# Patient Record
Sex: FEMALE | Race: WHITE
[De-identification: names, ages, dates, MRNs, and addresses within clinical notes are randomized per-mention and may not be internally consistent; named-entity substitution may affect disease eponyms.]

---

## 2021-04-29 ENCOUNTER — HOSPITAL ENCOUNTER (OUTPATIENT)
Dept: HOSPITAL 35 - LAB | Age: 51
End: 2021-04-29
Attending: NURSE PRACTITIONER
Payer: COMMERCIAL

## 2021-04-29 DIAGNOSIS — I10: Primary | ICD-10-CM

## 2021-04-29 LAB
ALBUMIN SERPL-MCNC: 3.7 G/DL (ref 3.4–5)
ALT SERPL-CCNC: 16 U/L (ref 30–65)
ANION GAP SERPL CALC-SCNC: 7 MMOL/L (ref 7–16)
AST SERPL-CCNC: 17 U/L (ref 15–37)
BASOPHILS NFR BLD AUTO: 0.3 % (ref 0–2)
BILIRUB SERPL-MCNC: 0.5 MG/DL (ref 0.2–1)
BUN SERPL-MCNC: 12 MG/DL (ref 7–18)
CALCIUM SERPL-MCNC: 9.3 MG/DL (ref 8.5–10.1)
CHLORIDE SERPL-SCNC: 101 MMOL/L (ref 98–107)
CO2 SERPL-SCNC: 29 MMOL/L (ref 21–32)
CREAT SERPL-MCNC: 1 MG/DL (ref 0.6–1)
EOSINOPHIL NFR BLD: 1.1 % (ref 0–3)
ERYTHROCYTE [DISTWIDTH] IN BLOOD BY AUTOMATED COUNT: 13.5 % (ref 10.5–14.5)
GLUCOSE SERPL-MCNC: 89 MG/DL (ref 74–106)
GRANULOCYTES NFR BLD MANUAL: 66.2 % (ref 36–66)
HCT VFR BLD CALC: 45.4 % (ref 37–47)
HGB BLD-MCNC: 15.8 GM/DL (ref 12–15)
LIPASE: 242 U/L (ref 73–393)
LYMPHOCYTES NFR BLD AUTO: 22.8 % (ref 24–44)
MCH RBC QN AUTO: 35.1 PG (ref 26–34)
MCHC RBC AUTO-ENTMCNC: 34.9 G/DL (ref 28–37)
MCV RBC: 100.5 FL (ref 80–100)
MONOCYTES NFR BLD: 9.6 % (ref 1–8)
NEUTROPHILS # BLD: 6.2 THOU/UL (ref 1.4–8.2)
PLATELET # BLD: 279 THOU/UL (ref 150–400)
POTASSIUM SERPL-SCNC: 4 MMOL/L (ref 3.5–5.1)
PROT SERPL-MCNC: 7.5 G/DL (ref 6.4–8.2)
RBC # BLD AUTO: 4.52 MIL/UL (ref 4.2–5)
SODIUM SERPL-SCNC: 137 MMOL/L (ref 136–145)
WBC # BLD AUTO: 9.4 THOU/UL (ref 4–11)

## 2021-08-09 ENCOUNTER — HOSPITAL ENCOUNTER (OUTPATIENT)
Dept: HOSPITAL 35 - GI | Age: 51
Discharge: HOME | End: 2021-08-09
Attending: INTERNAL MEDICINE
Payer: COMMERCIAL

## 2021-08-09 VITALS — BODY MASS INDEX: 18.25 KG/M2 | HEIGHT: 62.99 IN | WEIGHT: 103 LBS

## 2021-08-09 DIAGNOSIS — F17.210: ICD-10-CM

## 2021-08-09 DIAGNOSIS — I10: ICD-10-CM

## 2021-08-09 DIAGNOSIS — K29.50: Primary | ICD-10-CM

## 2021-08-09 DIAGNOSIS — Z98.890: ICD-10-CM

## 2021-08-09 DIAGNOSIS — Z88.8: ICD-10-CM

## 2021-08-09 DIAGNOSIS — F31.9: ICD-10-CM

## 2021-08-09 DIAGNOSIS — Z79.899: ICD-10-CM

## 2021-08-09 DIAGNOSIS — K20.90: ICD-10-CM

## 2021-08-09 PROCEDURE — 62110: CPT

## 2021-08-09 PROCEDURE — 62900: CPT

## 2021-08-11 NOTE — PATH
[facility]
[address]
[place]   [address]                     PATHOLOGY RPT PROCEDURE       
_______________________________________________________________________________
 
Name:       SINTIA ABDI            Room #:                     REG DEMETRICE UMANZOR.#:      [id number]     Account #:      [id number]  
Admission:  08/09/21    Date of Birth:  [birth year]  
Discharge:                                              Report #:    [id number]
                                                        Path Case #: [id number]
_______________________________________________________________________________
 
[facility] Accession Number: [id number]
.                                                                01
Material submitted:                                        .
esophagus - ESOPHAGEAL BX'S HX OF COLEMAN'S
.                                                                01
Clinical history:                                          .
EGD
COLEMAN'S ESOPHAGUS
.                                                                02
**********************************************************************
Diagnosis:
Gastroesophageal mucosa, esophagus history of Coleman's, endoscopic
biopsy:
- Gastric cardia-type mucosa with mild chronic inflammation and reactive
changes.
- Squamous mucosa with mild esophagitis.
- Negative for intestinal metaplasia (Coleman's mucosa) or dysplasia.
([facility]:larisa; 08/11/2021)
QMS  08/11/2021  1405 Local
**********************************************************************
.                                                                02
Electronically signed:                                     .
Zenaida Mena MD, Pathologist
NPI- [id number]
.                                                                01
Gross description:                                         .
The specimen is submitted in formalin, labeled "Sintia Abdi,
esophageal biopsy". Received are 2 segments of pale tan tissue ranging in
size from 0.2 to 0.3 cm in maximum dimensions. The specimen is submitted
in cassette A1.
([facility]; 8/10/2021)
NRI/NRI  08/10/2021  [facility] Local
.                                                                02
Pathologist provided ICD-10:
K29.50, K20.90
.                                                                02
CPT                                                        .
377408
Specimen Comment: A courtesy copy of this report has been sent to [phone]
[phone]
Specimen Comment: [phone]
Specimen Comment: Report sent to  / DR LAN
***Performed at:  01
   [address]  [id number]
   MD Germain Flores MD Phone:  [phone]
***Performed at:  02
 
 
[address]   [address]                     PATHOLOGY RPT PROCEDURE       
_______________________________________________________________________________
 
Name:       SINTIA ABDI            [facility]                     [facility]      [id number]     Account #:      [id number]  
Admission:  08/09/21    Date of Birth:  [birth year]  
Discharge:                                              Report #:    [id number]
                                                        Path Case #: [id number]
_______________________________________________________________________________
   [address]  [id number]
   MD Zenaida Mena MD Phone:  [phone]